# Patient Record
Sex: MALE | Race: WHITE | NOT HISPANIC OR LATINO | Employment: OTHER | URBAN - METROPOLITAN AREA
[De-identification: names, ages, dates, MRNs, and addresses within clinical notes are randomized per-mention and may not be internally consistent; named-entity substitution may affect disease eponyms.]

---

## 2018-04-25 ENCOUNTER — TRANSCRIBE ORDERS (OUTPATIENT)
Dept: ADMINISTRATIVE | Facility: HOSPITAL | Age: 63
End: 2018-04-25

## 2018-04-25 ENCOUNTER — HOSPITAL ENCOUNTER (OUTPATIENT)
Dept: GENERAL RADIOLOGY | Facility: HOSPITAL | Age: 63
Discharge: HOME | End: 2018-04-25
Attending: INTERNAL MEDICINE | Admitting: INTERNAL MEDICINE

## 2018-04-25 DIAGNOSIS — M25.532 LEFT WRIST PAIN: ICD-10-CM

## 2018-04-25 DIAGNOSIS — M25.532 LEFT WRIST PAIN: Primary | ICD-10-CM

## 2018-04-25 PROCEDURE — 73100 X-RAY EXAM OF WRIST: CPT

## 2021-04-06 ENCOUNTER — OFFICE VISIT (OUTPATIENT)
Dept: INTERNAL MEDICINE | Facility: CLINIC | Age: 66
End: 2021-04-06

## 2021-04-06 VITALS
RESPIRATION RATE: 16 BRPM | HEART RATE: 63 BPM | WEIGHT: 229 LBS | TEMPERATURE: 97.7 F | OXYGEN SATURATION: 98 % | SYSTOLIC BLOOD PRESSURE: 138 MMHG | DIASTOLIC BLOOD PRESSURE: 86 MMHG | BODY MASS INDEX: 28.47 KG/M2 | HEIGHT: 75 IN

## 2021-04-06 DIAGNOSIS — N52.9 ERECTILE DYSFUNCTION, UNSPECIFIED ERECTILE DYSFUNCTION TYPE: Primary | ICD-10-CM

## 2021-04-06 DIAGNOSIS — N40.0 BENIGN PROSTATIC HYPERPLASIA WITHOUT LOWER URINARY TRACT SYMPTOMS: ICD-10-CM

## 2021-04-06 PROCEDURE — 99213 OFFICE O/P EST LOW 20 MIN: CPT | Performed by: INTERNAL MEDICINE

## 2021-04-06 RX ORDER — SILDENAFIL 100 MG/1
100 TABLET, FILM COATED ORAL AS NEEDED
Qty: 30 TABLET | Refills: 6 | Status: SHIPPED | OUTPATIENT
Start: 2021-04-06 | End: 2021-12-06 | Stop reason: ALTCHOICE

## 2021-04-06 RX ORDER — SILDENAFIL 100 MG/1
100 TABLET, FILM COATED ORAL
COMMUNITY
End: 2021-04-06 | Stop reason: SDUPTHER

## 2021-10-25 ENCOUNTER — OFFICE VISIT (OUTPATIENT)
Dept: INTERNAL MEDICINE | Facility: CLINIC | Age: 66
End: 2021-10-25

## 2021-10-25 VITALS
TEMPERATURE: 97.3 F | SYSTOLIC BLOOD PRESSURE: 154 MMHG | HEART RATE: 60 BPM | DIASTOLIC BLOOD PRESSURE: 102 MMHG | RESPIRATION RATE: 16 BRPM | WEIGHT: 230 LBS | BODY MASS INDEX: 28.6 KG/M2 | OXYGEN SATURATION: 99 % | HEIGHT: 75 IN

## 2021-10-25 DIAGNOSIS — R03.0 ELEVATED BLOOD-PRESSURE READING WITHOUT DIAGNOSIS OF HYPERTENSION: ICD-10-CM

## 2021-10-25 DIAGNOSIS — Z01.818 PREOPERATIVE CLEARANCE: Primary | ICD-10-CM

## 2021-10-25 PROCEDURE — 99214 OFFICE O/P EST MOD 30 MIN: CPT | Performed by: INTERNAL MEDICINE

## 2021-10-25 RX ORDER — LOSARTAN POTASSIUM 50 MG/1
50 TABLET ORAL DAILY
Qty: 90 TABLET | Refills: 0 | Status: SHIPPED | OUTPATIENT
Start: 2021-10-25 | End: 2021-12-06 | Stop reason: SDUPTHER

## 2021-12-06 ENCOUNTER — OFFICE VISIT (OUTPATIENT)
Dept: INTERNAL MEDICINE | Facility: CLINIC | Age: 66
End: 2021-12-06

## 2021-12-06 VITALS
TEMPERATURE: 97.1 F | OXYGEN SATURATION: 99 % | RESPIRATION RATE: 16 BRPM | SYSTOLIC BLOOD PRESSURE: 152 MMHG | BODY MASS INDEX: 28.85 KG/M2 | WEIGHT: 232 LBS | HEIGHT: 75 IN | HEART RATE: 63 BPM | DIASTOLIC BLOOD PRESSURE: 88 MMHG

## 2021-12-06 DIAGNOSIS — N40.0 BENIGN PROSTATIC HYPERPLASIA WITHOUT LOWER URINARY TRACT SYMPTOMS: ICD-10-CM

## 2021-12-06 DIAGNOSIS — Z00.00 ROUTINE GENERAL MEDICAL EXAMINATION AT A HEALTH CARE FACILITY: Primary | ICD-10-CM

## 2021-12-06 DIAGNOSIS — I10 PRIMARY HYPERTENSION: ICD-10-CM

## 2021-12-06 PROCEDURE — 90732 PPSV23 VACC 2 YRS+ SUBQ/IM: CPT | Performed by: INTERNAL MEDICINE

## 2021-12-06 PROCEDURE — 99397 PER PM REEVAL EST PAT 65+ YR: CPT | Performed by: INTERNAL MEDICINE

## 2021-12-06 PROCEDURE — 90662 IIV NO PRSV INCREASED AG IM: CPT | Performed by: INTERNAL MEDICINE

## 2021-12-06 PROCEDURE — 90472 IMMUNIZATION ADMIN EACH ADD: CPT | Performed by: INTERNAL MEDICINE

## 2021-12-06 PROCEDURE — 90471 IMMUNIZATION ADMIN: CPT | Performed by: INTERNAL MEDICINE

## 2021-12-06 RX ORDER — LOSARTAN POTASSIUM AND HYDROCHLOROTHIAZIDE 12.5; 1 MG/1; MG/1
1 TABLET ORAL DAILY
Qty: 90 TABLET | Refills: 1 | Status: SHIPPED | OUTPATIENT
Start: 2021-12-06 | End: 2022-07-13 | Stop reason: SDUPTHER

## 2021-12-06 RX ORDER — TADALAFIL 20 MG/1
20 TABLET ORAL DAILY PRN
Qty: 30 TABLET | Refills: 1 | Status: SHIPPED | OUTPATIENT
Start: 2021-12-06 | End: 2022-09-09 | Stop reason: ALTCHOICE

## 2022-01-12 ENCOUNTER — OFFICE VISIT (OUTPATIENT)
Dept: INTERNAL MEDICINE | Facility: CLINIC | Age: 67
End: 2022-01-12

## 2022-01-12 VITALS
SYSTOLIC BLOOD PRESSURE: 142 MMHG | HEART RATE: 72 BPM | TEMPERATURE: 97.5 F | DIASTOLIC BLOOD PRESSURE: 84 MMHG | RESPIRATION RATE: 16 BRPM | OXYGEN SATURATION: 93 % | WEIGHT: 230 LBS | BODY MASS INDEX: 28.6 KG/M2 | HEIGHT: 75 IN

## 2022-01-12 DIAGNOSIS — I10 PRIMARY HYPERTENSION: Primary | ICD-10-CM

## 2022-01-12 DIAGNOSIS — N52.9 ERECTILE DYSFUNCTION, UNSPECIFIED ERECTILE DYSFUNCTION TYPE: ICD-10-CM

## 2022-01-12 PROCEDURE — 99213 OFFICE O/P EST LOW 20 MIN: CPT | Performed by: INTERNAL MEDICINE

## 2022-05-16 ENCOUNTER — TELEPHONE (OUTPATIENT)
Dept: INTERNAL MEDICINE | Facility: CLINIC | Age: 67
End: 2022-05-16

## 2022-07-13 RX ORDER — LOSARTAN POTASSIUM AND HYDROCHLOROTHIAZIDE 12.5; 1 MG/1; MG/1
1 TABLET ORAL DAILY
Qty: 90 TABLET | Refills: 1 | Status: SHIPPED | OUTPATIENT
Start: 2022-07-13 | End: 2023-03-21 | Stop reason: SDUPTHER

## 2022-07-25 ENCOUNTER — OFFICE VISIT (OUTPATIENT)
Dept: INTERNAL MEDICINE | Facility: CLINIC | Age: 67
End: 2022-07-25

## 2022-07-25 VITALS
HEART RATE: 75 BPM | WEIGHT: 230 LBS | HEIGHT: 75 IN | BODY MASS INDEX: 28.6 KG/M2 | OXYGEN SATURATION: 96 % | DIASTOLIC BLOOD PRESSURE: 72 MMHG | TEMPERATURE: 96.8 F | RESPIRATION RATE: 16 BRPM | SYSTOLIC BLOOD PRESSURE: 118 MMHG

## 2022-07-25 DIAGNOSIS — I10 PRIMARY HYPERTENSION: Primary | ICD-10-CM

## 2022-07-25 PROCEDURE — 99213 OFFICE O/P EST LOW 20 MIN: CPT | Performed by: INTERNAL MEDICINE

## 2022-09-09 ENCOUNTER — TELEPHONE (OUTPATIENT)
Dept: INTERNAL MEDICINE | Facility: CLINIC | Age: 67
End: 2022-09-09

## 2022-09-09 RX ORDER — SILDENAFIL 100 MG/1
100 TABLET, FILM COATED ORAL DAILY PRN
Qty: 15 TABLET | Refills: 6 | Status: SHIPPED | OUTPATIENT
Start: 2022-09-09 | End: 2023-03-21 | Stop reason: SDUPTHER

## 2023-03-21 ENCOUNTER — OFFICE VISIT (OUTPATIENT)
Dept: INTERNAL MEDICINE | Facility: CLINIC | Age: 68
End: 2023-03-21
Payer: MEDICARE

## 2023-03-21 VITALS
RESPIRATION RATE: 16 BRPM | HEIGHT: 75 IN | WEIGHT: 234 LBS | BODY MASS INDEX: 29.09 KG/M2 | DIASTOLIC BLOOD PRESSURE: 72 MMHG | OXYGEN SATURATION: 97 % | TEMPERATURE: 97.1 F | SYSTOLIC BLOOD PRESSURE: 134 MMHG | HEART RATE: 62 BPM

## 2023-03-21 DIAGNOSIS — Z00.00 ROUTINE GENERAL MEDICAL EXAMINATION AT A HEALTH CARE FACILITY: Primary | ICD-10-CM

## 2023-03-21 DIAGNOSIS — Z12.5 SPECIAL SCREENING FOR MALIGNANT NEOPLASM OF PROSTATE: ICD-10-CM

## 2023-03-21 DIAGNOSIS — I10 PRIMARY HYPERTENSION: ICD-10-CM

## 2023-03-21 PROCEDURE — 1170F FXNL STATUS ASSESSED: CPT | Performed by: INTERNAL MEDICINE

## 2023-03-21 PROCEDURE — G0438 PPPS, INITIAL VISIT: HCPCS | Performed by: INTERNAL MEDICINE

## 2023-03-21 PROCEDURE — 1159F MED LIST DOCD IN RCRD: CPT | Performed by: INTERNAL MEDICINE

## 2023-03-21 PROCEDURE — 1160F RVW MEDS BY RX/DR IN RCRD: CPT | Performed by: INTERNAL MEDICINE

## 2023-03-21 RX ORDER — SILDENAFIL 100 MG/1
100 TABLET, FILM COATED ORAL DAILY PRN
Qty: 15 TABLET | Refills: 6 | Status: SHIPPED | OUTPATIENT
Start: 2023-03-21

## 2023-03-21 RX ORDER — LOSARTAN POTASSIUM AND HYDROCHLOROTHIAZIDE 12.5; 1 MG/1; MG/1
1 TABLET ORAL DAILY
Qty: 90 TABLET | Refills: 1 | Status: SHIPPED | OUTPATIENT
Start: 2023-03-21

## 2023-03-22 LAB
ALBUMIN SERPL-MCNC: 4.5 G/DL (ref 3.5–5.2)
ALBUMIN/GLOB SERPL: 1.7 G/DL
ALP SERPL-CCNC: 80 U/L (ref 39–117)
ALT SERPL-CCNC: 23 U/L (ref 1–41)
APPEARANCE UR: CLEAR
AST SERPL-CCNC: 22 U/L (ref 1–40)
BACTERIA #/AREA URNS HPF: NORMAL /HPF
BASOPHILS # BLD AUTO: 0.05 10*3/MM3 (ref 0–0.2)
BASOPHILS NFR BLD AUTO: 0.9 % (ref 0–1.5)
BILIRUB SERPL-MCNC: 0.8 MG/DL (ref 0–1.2)
BILIRUB UR QL STRIP: NEGATIVE
BUN SERPL-MCNC: 18 MG/DL (ref 8–23)
BUN/CREAT SERPL: 18.2 (ref 7–25)
CALCIUM SERPL-MCNC: 9.7 MG/DL (ref 8.6–10.5)
CASTS URNS MICRO: NORMAL
CHLORIDE SERPL-SCNC: 103 MMOL/L (ref 98–107)
CHOLEST SERPL-MCNC: 154 MG/DL (ref 0–200)
CHOLEST/HDLC SERPL: 3.85 {RATIO}
CO2 SERPL-SCNC: 27.8 MMOL/L (ref 22–29)
COLOR UR: YELLOW
CREAT SERPL-MCNC: 0.99 MG/DL (ref 0.76–1.27)
EGFRCR SERPLBLD CKD-EPI 2021: 84 ML/MIN/1.73
EOSINOPHIL # BLD AUTO: 0.2 10*3/MM3 (ref 0–0.4)
EOSINOPHIL NFR BLD AUTO: 3.6 % (ref 0.3–6.2)
EPI CELLS #/AREA URNS HPF: NORMAL /HPF
ERYTHROCYTE [DISTWIDTH] IN BLOOD BY AUTOMATED COUNT: 13 % (ref 12.3–15.4)
GLOBULIN SER CALC-MCNC: 2.6 GM/DL
GLUCOSE SERPL-MCNC: 96 MG/DL (ref 65–99)
GLUCOSE UR QL STRIP: NEGATIVE
HCT VFR BLD AUTO: 45.3 % (ref 37.5–51)
HCV IGG SERPL QL IA: NON REACTIVE
HDLC SERPL-MCNC: 40 MG/DL (ref 40–60)
HGB BLD-MCNC: 15.4 G/DL (ref 13–17.7)
HGB UR QL STRIP: ABNORMAL
IMM GRANULOCYTES # BLD AUTO: 0.02 10*3/MM3 (ref 0–0.05)
IMM GRANULOCYTES NFR BLD AUTO: 0.4 % (ref 0–0.5)
KETONES UR QL STRIP: NEGATIVE
LDLC SERPL CALC-MCNC: 92 MG/DL (ref 0–100)
LEUKOCYTE ESTERASE UR QL STRIP: NEGATIVE
LYMPHOCYTES # BLD AUTO: 1.76 10*3/MM3 (ref 0.7–3.1)
LYMPHOCYTES NFR BLD AUTO: 32.1 % (ref 19.6–45.3)
MCH RBC QN AUTO: 29.5 PG (ref 26.6–33)
MCHC RBC AUTO-ENTMCNC: 34 G/DL (ref 31.5–35.7)
MCV RBC AUTO: 86.8 FL (ref 79–97)
MONOCYTES # BLD AUTO: 0.51 10*3/MM3 (ref 0.1–0.9)
MONOCYTES NFR BLD AUTO: 9.3 % (ref 5–12)
NEUTROPHILS # BLD AUTO: 2.94 10*3/MM3 (ref 1.7–7)
NEUTROPHILS NFR BLD AUTO: 53.7 % (ref 42.7–76)
NITRITE UR QL STRIP: NEGATIVE
NRBC BLD AUTO-RTO: 0 /100 WBC (ref 0–0.2)
PH UR STRIP: 6.5 [PH] (ref 5–8)
PLATELET # BLD AUTO: 240 10*3/MM3 (ref 140–450)
POTASSIUM SERPL-SCNC: 3.9 MMOL/L (ref 3.5–5.2)
PROT SERPL-MCNC: 7.1 G/DL (ref 6–8.5)
PROT UR QL STRIP: NEGATIVE
PSA SERPL-MCNC: 2.33 NG/ML (ref 0–4)
RBC # BLD AUTO: 5.22 10*6/MM3 (ref 4.14–5.8)
RBC #/AREA URNS HPF: NORMAL /HPF
SODIUM SERPL-SCNC: 138 MMOL/L (ref 136–145)
SP GR UR STRIP: 1.02 (ref 1–1.03)
TRIGL SERPL-MCNC: 121 MG/DL (ref 0–150)
TSH SERPL DL<=0.005 MIU/L-ACNC: 1.7 UIU/ML (ref 0.27–4.2)
UROBILINOGEN UR STRIP-MCNC: ABNORMAL MG/DL
VLDLC SERPL CALC-MCNC: 22 MG/DL (ref 5–40)
WBC # BLD AUTO: 5.48 10*3/MM3 (ref 3.4–10.8)
WBC #/AREA URNS HPF: NORMAL /HPF

## 2023-03-29 LAB
Lab: NORMAL
TESTOST SERPL-MCNC: 443 NG/DL (ref 264–916)
WRITTEN AUTHORIZATION: NORMAL

## 2023-08-31 ENCOUNTER — TELEPHONE (OUTPATIENT)
Dept: INTERNAL MEDICINE | Facility: CLINIC | Age: 68
End: 2023-08-31
Payer: MEDICARE

## 2023-09-01 ENCOUNTER — OFFICE VISIT (OUTPATIENT)
Dept: INTERNAL MEDICINE | Facility: CLINIC | Age: 68
End: 2023-09-01
Payer: MEDICARE

## 2023-09-01 VITALS
OXYGEN SATURATION: 98 % | WEIGHT: 231.4 LBS | DIASTOLIC BLOOD PRESSURE: 82 MMHG | TEMPERATURE: 97.1 F | RESPIRATION RATE: 16 BRPM | HEART RATE: 74 BPM | HEIGHT: 75 IN | BODY MASS INDEX: 28.77 KG/M2 | SYSTOLIC BLOOD PRESSURE: 132 MMHG

## 2023-09-01 DIAGNOSIS — Z01.818 PRE-OPERATIVE CLEARANCE: Primary | ICD-10-CM

## 2023-09-01 PROCEDURE — 99213 OFFICE O/P EST LOW 20 MIN: CPT | Performed by: INTERNAL MEDICINE

## 2023-09-01 RX ORDER — POLYETHYLENE GLYCOL 3350, SODIUM SULFATE, SODIUM CHLORIDE, POTASSIUM CHLORIDE, ASCORBIC ACID, SODIUM ASCORBATE 140-9-5.2G
3 KIT ORAL
COMMUNITY
Start: 2023-05-31

## 2023-10-17 ENCOUNTER — TELEPHONE (OUTPATIENT)
Dept: INTERNAL MEDICINE | Facility: CLINIC | Age: 68
End: 2023-10-17
Payer: MEDICARE

## 2023-10-24 ENCOUNTER — OFFICE VISIT (OUTPATIENT)
Dept: INTERNAL MEDICINE | Facility: CLINIC | Age: 68
End: 2023-10-24
Payer: MEDICARE

## 2023-10-24 VITALS
BODY MASS INDEX: 29.59 KG/M2 | HEART RATE: 74 BPM | OXYGEN SATURATION: 97 % | TEMPERATURE: 96.9 F | WEIGHT: 238 LBS | RESPIRATION RATE: 16 BRPM | HEIGHT: 75 IN | SYSTOLIC BLOOD PRESSURE: 132 MMHG | DIASTOLIC BLOOD PRESSURE: 80 MMHG

## 2023-10-24 DIAGNOSIS — N40.0 BENIGN PROSTATIC HYPERPLASIA WITHOUT LOWER URINARY TRACT SYMPTOMS: Primary | ICD-10-CM

## 2023-10-24 DIAGNOSIS — I10 PRIMARY HYPERTENSION: ICD-10-CM

## 2023-10-24 PROCEDURE — 99214 OFFICE O/P EST MOD 30 MIN: CPT | Performed by: INTERNAL MEDICINE

## 2023-10-24 RX ORDER — LOSARTAN POTASSIUM 100 MG/1
100 TABLET ORAL DAILY
Qty: 90 TABLET | Refills: 2 | Status: SHIPPED | OUTPATIENT
Start: 2023-10-24

## 2023-10-24 RX ORDER — TAMSULOSIN HYDROCHLORIDE 0.4 MG/1
1 CAPSULE ORAL DAILY
Qty: 90 CAPSULE | Refills: 1 | Status: SHIPPED | OUTPATIENT
Start: 2023-10-24

## 2024-03-25 ENCOUNTER — OFFICE VISIT (OUTPATIENT)
Dept: INTERNAL MEDICINE | Facility: CLINIC | Age: 69
End: 2024-03-25
Payer: MEDICARE

## 2024-03-25 VITALS
HEART RATE: 77 BPM | SYSTOLIC BLOOD PRESSURE: 118 MMHG | OXYGEN SATURATION: 98 % | HEIGHT: 75 IN | WEIGHT: 237 LBS | RESPIRATION RATE: 16 BRPM | BODY MASS INDEX: 29.47 KG/M2 | DIASTOLIC BLOOD PRESSURE: 80 MMHG | TEMPERATURE: 97.5 F

## 2024-03-25 DIAGNOSIS — I10 PRIMARY HYPERTENSION: Primary | ICD-10-CM

## 2024-03-25 DIAGNOSIS — N40.0 BENIGN PROSTATIC HYPERPLASIA WITHOUT LOWER URINARY TRACT SYMPTOMS: ICD-10-CM

## 2024-03-25 PROCEDURE — 99214 OFFICE O/P EST MOD 30 MIN: CPT | Performed by: INTERNAL MEDICINE

## 2024-03-25 RX ORDER — TAMSULOSIN HYDROCHLORIDE 0.4 MG/1
1 CAPSULE ORAL DAILY
Qty: 90 CAPSULE | Refills: 1 | Status: SHIPPED | OUTPATIENT
Start: 2024-03-25

## 2024-04-10 RX ORDER — SILDENAFIL 100 MG/1
100 TABLET, FILM COATED ORAL DAILY PRN
Qty: 75 TABLET | Refills: 4 | Status: SHIPPED | OUTPATIENT
Start: 2024-04-10

## 2024-06-12 RX ORDER — TAMSULOSIN HYDROCHLORIDE 0.4 MG/1
1 CAPSULE ORAL DAILY
Qty: 90 CAPSULE | Refills: 1 | Status: SHIPPED | OUTPATIENT
Start: 2024-06-12

## 2024-07-26 RX ORDER — LOSARTAN POTASSIUM 100 MG/1
100 TABLET ORAL DAILY
Qty: 90 TABLET | Refills: 2 | Status: SHIPPED | OUTPATIENT
Start: 2024-07-26

## 2024-09-11 RX ORDER — LOSARTAN POTASSIUM 100 MG/1
100 TABLET ORAL DAILY
Qty: 90 TABLET | Refills: 2 | Status: SHIPPED | OUTPATIENT
Start: 2024-09-11

## 2024-09-11 RX ORDER — TAMSULOSIN HYDROCHLORIDE 0.4 MG/1
1 CAPSULE ORAL DAILY
Qty: 90 CAPSULE | Refills: 1 | Status: SHIPPED | OUTPATIENT
Start: 2024-09-11

## 2024-09-30 ENCOUNTER — OFFICE VISIT (OUTPATIENT)
Dept: INTERNAL MEDICINE | Facility: CLINIC | Age: 69
End: 2024-09-30
Payer: MEDICARE

## 2024-09-30 VITALS
DIASTOLIC BLOOD PRESSURE: 80 MMHG | RESPIRATION RATE: 18 BRPM | HEIGHT: 75 IN | WEIGHT: 237.2 LBS | SYSTOLIC BLOOD PRESSURE: 126 MMHG | HEART RATE: 94 BPM | OXYGEN SATURATION: 98 % | BODY MASS INDEX: 29.49 KG/M2

## 2024-09-30 DIAGNOSIS — E74.819 DISORDERS OF GLUCOSE TRANSPORT, UNSPECIFIED: ICD-10-CM

## 2024-09-30 DIAGNOSIS — I10 PRIMARY HYPERTENSION: ICD-10-CM

## 2024-09-30 DIAGNOSIS — Z12.5 SPECIAL SCREENING FOR MALIGNANT NEOPLASM OF PROSTATE: ICD-10-CM

## 2024-09-30 DIAGNOSIS — N40.0 BENIGN PROSTATIC HYPERPLASIA WITHOUT LOWER URINARY TRACT SYMPTOMS: Primary | ICD-10-CM

## 2024-09-30 DIAGNOSIS — Z00.00 ROUTINE GENERAL MEDICAL EXAMINATION AT A HEALTH CARE FACILITY: ICD-10-CM

## 2024-09-30 PROCEDURE — 1170F FXNL STATUS ASSESSED: CPT | Performed by: INTERNAL MEDICINE

## 2024-09-30 PROCEDURE — G0439 PPPS, SUBSEQ VISIT: HCPCS | Performed by: INTERNAL MEDICINE

## 2024-10-01 LAB
ALBUMIN SERPL-MCNC: 4.1 G/DL (ref 3.5–5.2)
ALBUMIN/GLOB SERPL: 1.8 G/DL
ALP SERPL-CCNC: 89 U/L (ref 39–117)
ALT SERPL-CCNC: 18 U/L (ref 1–41)
APPEARANCE UR: CLEAR
AST SERPL-CCNC: 16 U/L (ref 1–40)
BACTERIA #/AREA URNS HPF: NORMAL /HPF
BASOPHILS # BLD AUTO: 0.05 10*3/MM3 (ref 0–0.2)
BASOPHILS NFR BLD AUTO: 1.1 % (ref 0–1.5)
BILIRUB SERPL-MCNC: 0.6 MG/DL (ref 0–1.2)
BILIRUB UR QL STRIP: NEGATIVE
BUN SERPL-MCNC: 11 MG/DL (ref 8–23)
BUN/CREAT SERPL: 11.3 (ref 7–25)
CALCIUM SERPL-MCNC: 9.1 MG/DL (ref 8.6–10.5)
CASTS URNS MICRO: NORMAL
CHLORIDE SERPL-SCNC: 105 MMOL/L (ref 98–107)
CHOLEST SERPL-MCNC: 145 MG/DL (ref 0–200)
CHOLEST/HDLC SERPL: 3.92 {RATIO}
CO2 SERPL-SCNC: 25.9 MMOL/L (ref 22–29)
COLOR UR: YELLOW
CREAT SERPL-MCNC: 0.97 MG/DL (ref 0.76–1.27)
EGFRCR SERPLBLD CKD-EPI 2021: 85 ML/MIN/1.73
EOSINOPHIL # BLD AUTO: 0.2 10*3/MM3 (ref 0–0.4)
EOSINOPHIL NFR BLD AUTO: 4.3 % (ref 0.3–6.2)
EPI CELLS #/AREA URNS HPF: NORMAL /HPF
ERYTHROCYTE [DISTWIDTH] IN BLOOD BY AUTOMATED COUNT: 13.4 % (ref 12.3–15.4)
GLOBULIN SER CALC-MCNC: 2.3 GM/DL
GLUCOSE SERPL-MCNC: 88 MG/DL (ref 65–99)
GLUCOSE UR QL STRIP: NEGATIVE
HBA1C MFR BLD: 5.5 % (ref 4.8–5.6)
HCT VFR BLD AUTO: 41.1 % (ref 37.5–51)
HDLC SERPL-MCNC: 37 MG/DL (ref 40–60)
HGB BLD-MCNC: 13.5 G/DL (ref 13–17.7)
HGB UR QL STRIP: NEGATIVE
IMM GRANULOCYTES # BLD AUTO: 0.01 10*3/MM3 (ref 0–0.05)
IMM GRANULOCYTES NFR BLD AUTO: 0.2 % (ref 0–0.5)
KETONES UR QL STRIP: NEGATIVE
LDLC SERPL CALC-MCNC: 84 MG/DL (ref 0–100)
LEUKOCYTE ESTERASE UR QL STRIP: NEGATIVE
LYMPHOCYTES # BLD AUTO: 1.47 10*3/MM3 (ref 0.7–3.1)
LYMPHOCYTES NFR BLD AUTO: 31.3 % (ref 19.6–45.3)
MCH RBC QN AUTO: 29.2 PG (ref 26.6–33)
MCHC RBC AUTO-ENTMCNC: 32.8 G/DL (ref 31.5–35.7)
MCV RBC AUTO: 88.8 FL (ref 79–97)
MONOCYTES # BLD AUTO: 0.45 10*3/MM3 (ref 0.1–0.9)
MONOCYTES NFR BLD AUTO: 9.6 % (ref 5–12)
NEUTROPHILS # BLD AUTO: 2.52 10*3/MM3 (ref 1.7–7)
NEUTROPHILS NFR BLD AUTO: 53.5 % (ref 42.7–76)
NITRITE UR QL STRIP: NEGATIVE
NRBC BLD AUTO-RTO: 0 /100 WBC (ref 0–0.2)
PH UR STRIP: 5.5 [PH] (ref 5–8)
PLATELET # BLD AUTO: 276 10*3/MM3 (ref 140–450)
POTASSIUM SERPL-SCNC: 4.1 MMOL/L (ref 3.5–5.2)
PROT SERPL-MCNC: 6.4 G/DL (ref 6–8.5)
PROT UR QL STRIP: NEGATIVE
PSA SERPL-MCNC: 2.59 NG/ML (ref 0–4)
RBC # BLD AUTO: 4.63 10*6/MM3 (ref 4.14–5.8)
RBC #/AREA URNS HPF: NORMAL /HPF
SODIUM SERPL-SCNC: 140 MMOL/L (ref 136–145)
SP GR UR STRIP: 1.02 (ref 1–1.03)
TRIGL SERPL-MCNC: 134 MG/DL (ref 0–150)
TSH SERPL DL<=0.005 MIU/L-ACNC: 1.34 UIU/ML (ref 0.27–4.2)
UROBILINOGEN UR STRIP-MCNC: NORMAL MG/DL
VLDLC SERPL CALC-MCNC: 24 MG/DL (ref 5–40)
WBC # BLD AUTO: 4.7 10*3/MM3 (ref 3.4–10.8)
WBC #/AREA URNS HPF: NORMAL /HPF

## 2024-12-03 ENCOUNTER — OFFICE VISIT (OUTPATIENT)
Dept: INTERNAL MEDICINE | Facility: CLINIC | Age: 69
End: 2024-12-03
Payer: MEDICARE

## 2024-12-03 VITALS
DIASTOLIC BLOOD PRESSURE: 86 MMHG | HEIGHT: 75 IN | BODY MASS INDEX: 28.85 KG/M2 | HEART RATE: 78 BPM | WEIGHT: 232 LBS | OXYGEN SATURATION: 100 % | SYSTOLIC BLOOD PRESSURE: 132 MMHG

## 2024-12-03 DIAGNOSIS — N52.9 ERECTILE DYSFUNCTION, UNSPECIFIED ERECTILE DYSFUNCTION TYPE: ICD-10-CM

## 2024-12-03 DIAGNOSIS — R53.83 OTHER FATIGUE: Primary | ICD-10-CM

## 2024-12-03 DIAGNOSIS — N40.0 BENIGN PROSTATIC HYPERPLASIA WITHOUT LOWER URINARY TRACT SYMPTOMS: ICD-10-CM

## 2024-12-03 DIAGNOSIS — I10 PRIMARY HYPERTENSION: ICD-10-CM

## 2024-12-03 DIAGNOSIS — G47.33 OSA (OBSTRUCTIVE SLEEP APNEA): ICD-10-CM

## 2024-12-03 PROCEDURE — 99214 OFFICE O/P EST MOD 30 MIN: CPT | Performed by: INTERNAL MEDICINE

## 2024-12-03 PROCEDURE — G2211 COMPLEX E/M VISIT ADD ON: HCPCS | Performed by: INTERNAL MEDICINE

## 2024-12-03 RX ORDER — AMLODIPINE BESYLATE 2.5 MG/1
2.5 TABLET ORAL DAILY
Qty: 90 TABLET | Refills: 1 | Status: SHIPPED | OUTPATIENT
Start: 2024-12-03

## 2024-12-03 NOTE — PROGRESS NOTES
"Chief Complaint  Follow-up (Nose bleed for almost  5 hours, High BP, Urination had to stop same time had nose Bleed)    Subjective        Valerio Krishnan presents to [facility]  History of Present Illness  Was seen in the emergency department for epistaxis.  Eventually it stopped with no other particular intervention.  He is trying to irrigate the nose.  They recently got a whole house humidifier.  He does have some dryness in the mouth when he sleeps but thinks he has sleep apnea or at least his wife thinks he has sleep apnea.  Also complains of a lot of fatigue and decreased libido and erectile dysfunction.  Had a testosterone level checked last year which was normal.  Some elevated blood pressures at home.  Repeat blood pressure today was good.  Systolic blood pressures in the 140s and 150s at home  Objective   Vital Signs:  /86   Pulse 78   Ht 190.5 cm (75\")   Wt 105 kg (232 lb)   SpO2 100%   BMI 29.00 kg/m²   Estimated body mass index is 29 kg/m² as calculated from the following:    Height as of this encounter: 190.5 cm (75\").    Weight as of this encounter: 105 kg (232 lb).            Physical Exam  Vitals and nursing note reviewed.   Constitutional:       Appearance: Normal appearance.   HENT:      Head: Normocephalic and atraumatic.      Right Ear: Tympanic membrane normal.      Left Ear: Tympanic membrane normal.      Nose:      Comments: No obvious bleeding vessels in the nares     Mouth/Throat:      Pharynx: Oropharynx is clear.   Cardiovascular:      Rate and Rhythm: Normal rate and regular rhythm.   Pulmonary:      Effort: Pulmonary effort is normal.      Breath sounds: Normal breath sounds.   Abdominal:      General: Abdomen is flat.      Palpations: Abdomen is soft.   Musculoskeletal:         General: No swelling or deformity.      Cervical back: Neck supple.      Right lower leg: No edema.      Left lower leg: No edema.   Skin:     General: " Skin is warm.      Capillary Refill: Capillary refill takes less than 2 seconds.      Findings: No rash.   Neurological:      General: No focal deficit present.      Mental Status: He is alert and oriented to person, place, and time.        Result Review :      Data reviewed : Recent hospitalization notes ER records reviewed           Assessment and Plan   Diagnoses and all orders for this visit:    1. Other fatigue (Primary)  -     Testosterone    2. Erectile dysfunction, unspecified erectile dysfunction type    3. Benign prostatic hyperplasia without lower urinary tract symptoms    4. Primary hypertension  -     Home Sleep Study; Future    5. MOODY (obstructive sleep apnea)  -     Home Sleep Study; Future    Other orders  -     amLODIPine (NORVASC) 2.5 MG tablet; Take 1 tablet by mouth Daily.  Dispense: 90 tablet; Refill: 1    Epistaxis and hypertension in the emergency department.  He was given clonidine in the hospital apparently.  Blood pressure looks much better today.  He is getting systolics in the 140s and 150s at home and he takes the losartan for the most part every day.  He did not tolerate the hydrochlorothiazide previously so we will start with a low-dose addition of amlodipine 2.5 mg daily and recheck in 1 month.  Fatigue, ED and decreased libido.  Check testosterone level last year and was normal.  Repeated this year.  Probably multifactorial and typical for the age.  If the testosterone level is low and he is interested in TRT we can address that  If the nose continues to bleed we can have see ENT although I do not think he needs to do that yet.  Possible MOODY with snoring and witnessed apnea per his wife.  Will schedule home sleep study         Follow Up   Return in about 4 weeks (around 12/31/2024).  Patient was given instructions and counseling regarding his condition or for health maintenance advice. Please see specific information pulled into the AVS if appropriate.

## 2024-12-04 LAB — TESTOST SERPL-MCNC: 455 NG/DL (ref 264–916)

## 2024-12-31 ENCOUNTER — OFFICE VISIT (OUTPATIENT)
Dept: INTERNAL MEDICINE | Facility: CLINIC | Age: 69
End: 2024-12-31
Payer: MEDICARE

## 2024-12-31 VITALS
WEIGHT: 232 LBS | HEIGHT: 75 IN | RESPIRATION RATE: 16 BRPM | TEMPERATURE: 97.9 F | DIASTOLIC BLOOD PRESSURE: 88 MMHG | BODY MASS INDEX: 28.85 KG/M2 | HEART RATE: 74 BPM | OXYGEN SATURATION: 98 % | SYSTOLIC BLOOD PRESSURE: 134 MMHG

## 2024-12-31 DIAGNOSIS — I10 PRIMARY HYPERTENSION: Primary | ICD-10-CM

## 2024-12-31 PROCEDURE — 99213 OFFICE O/P EST LOW 20 MIN: CPT | Performed by: INTERNAL MEDICINE

## 2024-12-31 RX ORDER — AMLODIPINE BESYLATE 5 MG/1
5 TABLET ORAL DAILY
Qty: 90 TABLET | Refills: 1 | Status: SHIPPED | OUTPATIENT
Start: 2024-12-31

## 2024-12-31 RX ORDER — TAMSULOSIN HYDROCHLORIDE 0.4 MG/1
1 CAPSULE ORAL DAILY
COMMUNITY
Start: 2024-12-09

## 2024-12-31 NOTE — PROGRESS NOTES
"Chief Complaint  Hypertension    Subjective        Valerio Krishnan presents to [facility]  Hypertension      Denies any significant headache, shortness of breath or chest pain.  No visual changes.  Taking medication without complication.   Objective   Vital Signs:  /88 (BP Location: Left arm, Patient Position: Sitting, Cuff Size: Large Adult)   Pulse 74   Temp 97.9 °F (36.6 °C) (Temporal)   Resp 16   Ht 190.5 cm (75\")   Wt 105 kg (232 lb)   SpO2 98%   BMI 29.00 kg/m²   Estimated body mass index is 29 kg/m² as calculated from the following:    Height as of this encounter: 190.5 cm (75\").    Weight as of this encounter: 105 kg (232 lb).            Physical Exam  Vitals and nursing note reviewed.   Constitutional:       Appearance: Normal appearance.   HENT:      Head: Normocephalic and atraumatic.   Cardiovascular:      Rate and Rhythm: Normal rate and regular rhythm.   Pulmonary:      Effort: Pulmonary effort is normal.      Breath sounds: Normal breath sounds.   Abdominal:      General: Abdomen is flat.      Palpations: Abdomen is soft.   Musculoskeletal:         General: No swelling or deformity.      Cervical back: Neck supple.      Right lower leg: No edema.      Left lower leg: No edema.   Skin:     General: Skin is warm.      Capillary Refill: Capillary refill takes less than 2 seconds.      Findings: No rash.   Neurological:      General: No focal deficit present.      Mental Status: He is alert and oriented to person, place, and time.        Result Review :                Assessment and Plan   Diagnoses and all orders for this visit:    1. Primary hypertension (Primary)    Other orders  -     amLODIPine (NORVASC) 5 MG tablet; Take 1 tablet by mouth Daily.  Dispense: 90 tablet; Refill: 1    Hypertension better controlled.  Still mild diastolic elevation.  He is monitoring closely at home.  Increase amlodipine to 5 mg and recheck in about 3 months " or sooner if any problems.         Follow Up   Return in about 3 months (around 3/31/2025) for Recheck.  Patient was given instructions and counseling regarding his condition or for health maintenance advice. Please see specific information pulled into the AVS if appropriate.

## 2025-03-05 RX ORDER — TAMSULOSIN HYDROCHLORIDE 0.4 MG/1
1 CAPSULE ORAL DAILY
Qty: 90 CAPSULE | Refills: 4 | Status: SHIPPED | OUTPATIENT
Start: 2025-03-05

## 2025-03-10 ENCOUNTER — OFFICE VISIT (OUTPATIENT)
Dept: SLEEP MEDICINE | Facility: HOSPITAL | Age: 70
End: 2025-03-10
Payer: MEDICARE

## 2025-03-10 VITALS
HEART RATE: 71 BPM | DIASTOLIC BLOOD PRESSURE: 68 MMHG | WEIGHT: 233 LBS | BODY MASS INDEX: 28.97 KG/M2 | OXYGEN SATURATION: 98 % | SYSTOLIC BLOOD PRESSURE: 115 MMHG | HEIGHT: 75 IN

## 2025-03-10 DIAGNOSIS — E66.3 OVERWEIGHT (BMI 25.0-29.9): ICD-10-CM

## 2025-03-10 DIAGNOSIS — G47.33 OBSTRUCTIVE SLEEP APNEA, ADULT: Primary | ICD-10-CM

## 2025-03-10 PROCEDURE — G0463 HOSPITAL OUTPT CLINIC VISIT: HCPCS

## 2025-03-12 ENCOUNTER — DOCUMENTATION (OUTPATIENT)
Dept: SLEEP MEDICINE | Facility: HOSPITAL | Age: 70
End: 2025-03-12
Payer: MEDICARE

## 2025-03-12 NOTE — PROGRESS NOTES
Pt was seen in sleep center by Dr. Wolfe.  New set up for therapy device and supply order has been sent over to [facility].  Compliance scheduled for 5/2025

## 2025-05-19 ENCOUNTER — OFFICE VISIT (OUTPATIENT)
Dept: SLEEP MEDICINE | Facility: HOSPITAL | Age: 70
End: 2025-05-19
Payer: MEDICARE

## 2025-05-19 ENCOUNTER — DOCUMENTATION (OUTPATIENT)
Dept: SLEEP MEDICINE | Facility: HOSPITAL | Age: 70
End: 2025-05-19
Payer: MEDICARE

## 2025-05-19 VITALS
WEIGHT: 230 LBS | SYSTOLIC BLOOD PRESSURE: 106 MMHG | OXYGEN SATURATION: 98 % | DIASTOLIC BLOOD PRESSURE: 62 MMHG | HEART RATE: 78 BPM | BODY MASS INDEX: 28.6 KG/M2 | HEIGHT: 75 IN

## 2025-05-19 DIAGNOSIS — G47.10 PERSISTENT HYPERSOMNOLENCE DISORDER: ICD-10-CM

## 2025-05-19 DIAGNOSIS — Z78.9 DIFFICULTY USING CONTINUOUS POSITIVE AIRWAY PRESSURE (CPAP) FULL FACE MASK: ICD-10-CM

## 2025-05-19 DIAGNOSIS — G47.33 OBSTRUCTIVE SLEEP APNEA, ADULT: Primary | ICD-10-CM

## 2025-05-19 DIAGNOSIS — E66.3 OVERWEIGHT (BMI 25.0-29.9): ICD-10-CM

## 2025-05-19 PROCEDURE — G0463 HOSPITAL OUTPT CLINIC VISIT: HCPCS

## 2025-05-19 NOTE — PROGRESS NOTES
"[facility] [place] [facility]  [address]  [place] [address]  [phone]    PCP: Connor Leslie MD (Tony)    Reason for visit:  Sleep disorders: MOODY    Valerio is a 69 y.o.male who was seen in the Sleep Disorders Center today. He was setup with CPAP and here to review. He finds it beneficial and sleeps better but still with elevated AHI. He sleeps from 10pm to 5am. He uses ffm, fits well but wants to try nasal interface. He is somewhat rested when he awakens but gets sleepy by the evening.  Whiting Sleepiness Scale is 7. Caffeine 1.5 per day. Alcohol 0 per week.    Valerio  reports that he has never smoked. He has never been exposed to tobacco smoke. He has never used smokeless tobacco.    Pertinent Positive Review of Systems of denies  Rest of Review of Systems was negative as recorded in Sleep Questionnaire.    Patient  has a past medical history of Benign prostatic hyperplasia without lower urinary tract symptoms, ED (erectile dysfunction), Elevated blood pressure reading without diagnosis of hypertension, Other chest pain, and Pain, wrist.     Current Medications:    Current Outpatient Medications:     amLODIPine (NORVASC) 5 MG tablet, Take 1 tablet by mouth Daily., Disp: 90 tablet, Rfl: 1    losartan (Cozaar) 100 MG tablet, Take 1 tablet by mouth Daily., Disp: 90 tablet, Rfl: 2    PEG-KCl-NaCl-NaSulf-Na Asc-C (Plenvu) 140 g reconstituted solution solution, Take 420 g by mouth. (Patient not taking: Reported on 10/24/2023), Disp: , Rfl:     sildenafil (VIAGRA) 100 MG tablet, TAKE 1 TABLET BY MOUTH EVERY DAY AS NEEDED FOR ERECTILE DYSFUNCTION, Disp: 75 tablet, Rfl: 4    tamsulosin (FLOMAX) 0.4 MG capsule 24 hr capsule, Take 1 capsule by mouth once daily, Disp: 90 capsule, Rfl: 4   also entered in Sleep Questionnaire         Vital Signs: /62   Pulse 78   Ht 190.5 cm (75\")   Wt 104 kg (230 lb)   SpO2 98%   BMI 28.75 kg/m²     Body mass index is 28.75 kg/m².       " "Tongue: Large       Dentition: good       Pharynx: Posterior pharyngeal pillars are unable   Mallampatti: IV (only hard palate visible)        General: Alert. Cooperative. Well developed. No acute distress.             Nose: No septal deviation. No drainage.          Throat: No oral lesions. No thrush. Moist mucous membranes.           Lungs:  Clear to auscultation bilaterally.            Heart:  Regular rhythm and normal rate. No murmur.     Diagnostic data available to date is as below and was reviewed on current visit:  12/27/2024: AHI 53.5 on snap diagnostics test done by PCP. O2 saturation below 89% for 75 minutes or 22% of sleep time. Positional data is unavailable.     No results found for: \"IRON\", \"TIBC\", \"FERRITIN\"    Most current available usage data reviewed on 05/19/2025:  No scans are attached to the encounter or orders placed in the encounter.  98% compliance average 6-1/2 hours AHI 15.9 with average pressure 12 cm.  No significant air leaks are seen.    DME Company: [facility]    [place] [facility]:    full face mask    Replace head-gear every 3 months.  Replace cushions every 2-4 weeks.     A 4604 Heated Tubing  every 3 mth    A 7037 Standard Tubing  every 3 mth   x A 7035 Headgear  every 3 mth   x A 7046 Repl Humidifier Chamber  every 6 yrs   x A 7038 Disposable Filters  2 per mth   x A 7039 Non-disposable Filter  every 6 mth   x A 7036 Chin Strap  every 6 mth     No orders of the defined types were placed in this encounter.         Impression:  1. Obstructive sleep apnea, adult    2. Overweight (BMI 25.0-29.9)    3. Persistent hypersomnolence disorder    4. Difficulty using continuous positive airway pressure (CPAP) full face mask        Plan:  Valerio is compliant. Change to auto 10-20. If AHI still elevated then do titration.  Patient continues to get sleepy later in the day.  He will speak to DME about switching his mask style.    Effective treatment of " sleep apnea reduces the associated cardiovascular and cerebrovascular risks associated. In patients with elevated BMI, weight loss can be additionally beneficial. With use of positive pressure device; change of supplies per the permitted insurance schedule is necessary.    This patient is compliant with PAP machine and benefits from its use.  Apnea hypopneas index is corrected/improved.  Daytime hypersomnolence has resolved.     Patient will follow up in this clinic in 2 months APRN    Thank you for allowing me to participate in your patient's care.    Electronically signed by Ruben Wolfe MD, 05/19/25, 4:08 PM EDT.    Part of this note may be an electronic transcription/translation of spoken language to printed text using the Dragon Dictation System.

## 2025-05-19 NOTE — PROGRESS NOTES
Patient was seen in sleep center office today.  Per Dr. Wolfe :  Pressure changes were made in [facility]     Device   [id number] - AirSense 11 AutoSet   View  05/19/2025, 12:54 PM    by Anuj Lilly    Settings sent to device    Request [id number]    Set Mode to AutoSet  Set Min Pressure to 5 cmH2O  Set Max Pressure to 10 cmH2O  Set Response to Standard  Set EPR to Fulltime  Set EPR level to 2  Set Ramp enable to On  Set Ramp time to 5 min  Set Start pressure to 4.0 cmH2O  Set Humidifier level to 6  05/02/2024, 06:34 AM    Settings confirmed on device    Set Mode to AutoSet  Set Min Pressure to 5 cmH2O  Set Max Pressure to 20 cmH2O  Set Response to Standard  Set EPR to Fulltime  Set EPR level to 2  Set Ramp enable to On  Set Ramp time to 5 min  Set Start pressure to 4.0 cmH2O  Set Humidifier level to 6  02/27/2024, 08:09 PM    Settings confirmed on device    Set Mode to AutoSet  Set Min Pressure to 5 cmH2O  Set Max Pressure to 20 cmH2O  Set Response to Standard  Set EPR to Fulltime  Set EPR level to 2  Set Ramp enable to On  Set Ramp time to 5 min  Set Start pressure to 4.0 cmH2O  Set Humidifier level to 6  02/24/2024, 08:01 PM    Settings confirmed on device    Set Mode to AutoSet  Set Min Pressure to 5 cmH2O  Set Max Pressure to 20 cmH2O  Set Response to Standard  Set EPR to Fulltime  Set EPR level to 2  Set Ramp enable to On  Set Ramp time to 5 min  Set Start pressure to 4.0 cmH2O  Set Humidifier level to 8  09/16/2023, 09:47 PM    Settings confirmed on device    Set Mode to AutoSet  Set Min Pressure to 5 cmH2O  Set Max Pressure to 20 cmH2O  Set Response to Standard  Set EPR to Fulltime  Set EPR level to 2  Set Ramp enable to On  Set Ramp time to 5 min  Set Start pressure to 4.0 cmH2O  Set Humidifier level to 5  08/18/2023, 06:21 PM    Settings confirmed on device    Set Mode to AutoSet  Set Min Pressure to 5 cmH2O  Set Max Pressure to 20 cmH2O  Set Response to Standard  Set EPR to  Fulltime  Set EPR level to 2  Set Ramp enable to Auto  Set Start pressure to 4.0 cmH2O  Set Humidifier level to 5  08/18/2023, 06:21 PM    Settings confirmed on device    Set Mode to AutoSet  Set Min Pressure to 5 cmH2O  Set Max Pressure to 20 cmH2O  Set Response to Standard  Set EPR to Off  Set Ramp enable to Auto  Set Start pressure to 4.0 cmH2O  Set Humidifier level to 5  08/18/2023, 06:21 PM    Settings confirmed on device    Set Mode to AutoSet  Set Min Pressure to 5 cmH2O  Set Max Pressure to 20 cmH2O  Set Response to Standard  Set EPR to Fulltime  Set EPR level to 2  Set Ramp enable to Auto  Set Start pressure to 4.0 cmH2O  Set Humidifier level to 5  07/28/2023, 04:09 AM    Settings confirmed on device    Set Mode to AutoSet  Set Min Pressure to 5 cmH2O  Set Max Pressure to 20 cmH2O  Set Response to Standard  Set EPR to Fulltime  Set EPR level to 2  Set Ramp enable to Auto  Set Start pressure to 4.0 cmH2O  Set Humidifier level to 4  06/08/2023, 04:48 PM    Settings confirmed on device    Set Mode to AutoSet  Set Min Pressure to 5 cmH2O  Set Max Pressure to 20 cmH2O  Set Response to Standard  Set EPR to Fulltime  Set EPR level to 2  Set Ramp enable to Auto  Set Start pressure to 4.0 cmH2O  Set Humidifier level to 4  05/28/2023, 07:46 PM    Settings confirmed on device    Set Mode to AutoSet  Set Min Pressure to 5 cmH2O  Set Max Pressure to 20 cmH2O  Set Response to Standard  Set EPR to Fulltime  Set EPR level to 2  Set Ramp enable to Auto  Set Start pressure to 4.0 cmH2O  Set Humidifier level to 3  04/20/2023, 02:12 PM    Settings confirmed on device    Set Mode to AutoSet  Set Min Pressure to 5 cmH2O  Set Max Pressure to 20 cmH2O  Set Response to Standard  Set EPR to Fulltime  Set EPR level to 2  Set Ramp enable to Auto  Set Start pressure to 4.0 cmH2O  Set Humidifier level to 7  03/24/2022, 12:51 PM    Settings confirmed on device    Set Mode to AutoSet  Set Min Pressure to 5 cmH2O  Set Max Pressure to 20  cmH2O  Set Response to Standard  Set EPR to Fulltime  Set EPR level to 2  Set Ramp enable to Auto  Set Start pressure to 4.0 cmH2O  Set Humidifier level to 5  03/23/2022, 07:36 PM    Settings confirmed on device    Set Mode to AutoSet  Set Min Pressure to 5 cmH2O  Set Max Pressure to 20 cmH2O  Set Response to Standard  Set EPR to Fulltime  Set EPR level to 2  Set Ramp enable to Auto  Set Start pressure to 4.0 cmH2O  Set Humidifier level to 4  03/23/2022, 10:52 AM    Settings confirmed on device    Set Mode to AutoSet  Set Min Pressure to 5 cmH2O  Set Max Pressure to 20 cmH2O  Set Response to Standard  Set EPR to Fulltime  Set EPR level to 2  Set Ramp enable to Auto  Set Start pressure to 4.0 cmH2O  Set Humidifier level to 4  03/23/2022, 10:50 AM    Settings confirmed on device    Set Mode to AutoSet  Set Min Pressure to 5 cmH2O  Set Max Pressure to 20 cmH2O  Set Response to Standard  Set EPR to Fulltime  Set EPR level to 2  Set Ramp enable to Auto  Set Start pressure to 4.0 cmH2O  Set Humidifier level to 4  03/23/2022, 10:50 AM    Settings confirmed on device    Set Mode to AutoSet  Set Min Pressure to 5 cmH2O  Set Max Pressure to 20 cmH2O  Set Response to Standard  Set EPR to Off  Set Ramp enable to Auto  Set Start pressure to 4.0 cmH2O  Set Humidifier level to 4

## 2025-05-23 ENCOUNTER — DOCUMENTATION (OUTPATIENT)
Dept: SLEEP MEDICINE | Facility: HOSPITAL | Age: 70
End: 2025-05-23
Payer: MEDICARE

## 2025-05-23 NOTE — PROGRESS NOTES
Per APRN Cindi:  Pressure changes were made in sleep center office in [facility]:    Device   [id number] - AirSense 11 AutoSet   View  05/23/2025, 12:06 PM    by Anuj Lilly    Settings sent to device    Request [id number]    Set Mode to AutoSet  Set Min Pressure to 7 cmH2O  Set Max Pressure to 18 cmH2O  Set Response to Standard  Set EPR to Fulltime  Set EPR level to 2  Set Ramp enable to On  Set Ramp time to 20 min  Set Start pressure to 5.0 cmH2O  Set Climate Control to Auto  Set Humidifier level to Auto  Set Tube temperature to Auto  05/20/2025, 03:27 AM    Settings confirmed on device    Set Mode to AutoSet  Set Min Pressure to 10 cmH2O  Set Max Pressure to 20 cmH2O  Set Response to Standard  Set EPR to Fulltime  Set EPR level to 2  Set Ramp enable to On  Set Ramp time to 20 min  Set Start pressure to 5.0 cmH2O  Set Climate Control to Auto  Set Humidifier level to Auto  Set Tube temperature to Auto

## 2025-06-04 ENCOUNTER — TELEPHONE (OUTPATIENT)
Dept: SLEEP MEDICINE | Facility: HOSPITAL | Age: 70
End: 2025-06-04
Payer: MEDICARE

## 2025-06-05 ENCOUNTER — DOCUMENTATION (OUTPATIENT)
Dept: SLEEP MEDICINE | Facility: HOSPITAL | Age: 70
End: 2025-06-05
Payer: MEDICARE

## 2025-06-05 NOTE — PROGRESS NOTES
Pt called the sleep center office and stated that he is having pressure issues with his device.    Epic msg had been sent to Dr. Wolfe and DORIE CHOWDHURY.  Per DORIE CHOWDHURY pressure changes were made in [facility] in the sleep center office.  Pt has been called and told of the pressure change and if any more issues pls reach out to the sleep center.    Device   [id number] - AirSense 11 AutoSet   View  06/05/2025, 06:04 AM    by Anuj Lilly    Settings sent to device    Request [id number]    Set Mode to AutoSet  Set Min Pressure to 8 cmH2O  Set Max Pressure to 18 cmH2O  Set Response to Standard  Set EPR to Fulltime  Set EPR level to 2  Set Ramp enable to On  Set Ramp time to 20 min  Set Start pressure to 5.0 cmH2O  Set Climate Control to Auto  Set Humidifier level to Auto  Set Tube temperature to Auto  05/30/2025, 09:57 PM    Settings confirmed on device    Set Mode to AutoSet  Set Min Pressure to 10 cmH2O  Set Max Pressure to 20 cmH2O  Set Response to Standard  Set EPR to Fulltime  Set EPR level to 2  Set Ramp enable to On  Set Ramp time to 20 min  Set Start pressure to 5.0 cmH2O  Set Climate Control to Auto  Set Humidifier level to Auto  Set Tube temperature to Auto

## 2025-06-07 ENCOUNTER — TELEPHONE (OUTPATIENT)
Dept: SLEEP MEDICINE | Facility: HOSPITAL | Age: 70
End: 2025-06-07
Payer: MEDICARE

## 2025-06-07 NOTE — TELEPHONE ENCOUNTER
----- Message from Cindi Day sent at 6/4/2025  4:42 PM EDT -----  Please change to 8-18 cm H2O. Call if intolerant. Let's review download in a few weeks.  ----- Message -----  From: Jana Wagner  Sent: 6/4/2025   2:28 PM EDT  To: Ruben Wolfe MD; DORIE Velasquez    Patient called today stating he would like pressure back down.

## 2025-07-07 RX ORDER — SILDENAFIL 100 MG/1
100 TABLET, FILM COATED ORAL DAILY PRN
Qty: 75 TABLET | Refills: 0 | Status: SHIPPED | OUTPATIENT
Start: 2025-07-07

## 2025-07-08 ENCOUNTER — TELEPHONE (OUTPATIENT)
Dept: SLEEP MEDICINE | Facility: HOSPITAL | Age: 70
End: 2025-07-08
Payer: MEDICARE

## 2025-07-08 ENCOUNTER — DOCUMENTATION (OUTPATIENT)
Dept: SLEEP MEDICINE | Facility: HOSPITAL | Age: 70
End: 2025-07-08
Payer: MEDICARE

## 2025-07-08 NOTE — TELEPHONE ENCOUNTER
----- Message from Ruben Wolfe sent at 7/8/2025  8:53 AM EDT -----  Regarding: RE: Down load that you requested from 6/4/2025  Reviewed. Looks like he did not tolerate higher pr and was changed to 8-18. AHI is slightly better. If he is still sleepy can do titration study. Otherwise see as scheduled.    Dr. Wolfe  ----- Message -----  From: Maritza Martinez  Sent: 7/2/2025  10:06 AM EDT  To: Ruben Wolfe MD; DORIE Velasquez  Subject: Down load that you requested from 6/4/2025       Pressure changes were made on 6/4/2025 per Epic message.  I have printed off the DL and sent it to scan today.

## 2025-07-09 ENCOUNTER — TELEPHONE (OUTPATIENT)
Dept: SLEEP MEDICINE | Facility: HOSPITAL | Age: 70
End: 2025-07-09
Payer: MEDICARE

## 2025-07-09 ENCOUNTER — DOCUMENTATION (OUTPATIENT)
Dept: SLEEP MEDICINE | Facility: HOSPITAL | Age: 70
End: 2025-07-09
Payer: MEDICARE

## 2025-07-09 NOTE — PROGRESS NOTES
Per Epic meessage from Dr. Wolfe.  Pt was called and asked how he is doing with the new pressure changes.  Pt stated that he feels fine and will call the sleep center if he has any issues.

## 2025-07-09 NOTE — TELEPHONE ENCOUNTER
----- Message from Maritza IZAGUIRRE sent at 7/9/2025  8:56 AM EDT -----  Regarding: RE: Down load that you requested from 6/4/2025  Called Pt this morning.  Pt stated that he has no issue as of now with feeling sleepy.  Pt stated that he will call the sleep center if he is having any issue .  ----- Message -----  From: Ruben Wolfe MD  Sent: 7/8/2025   8:54 AM EDT  To: DORIE Velasquez; Maritza Martinez  Subject: RE: Down load that you requested from 6/4/20#    Reviewed. Looks like he did not tolerate higher pr and was changed to 8-18. AHI is slightly better. If he is still sleepy can do titration study. Otherwise see as scheduled.    Dr. Wolfe  ----- Message -----  From: Maritza Martinez  Sent: 7/2/2025  10:06 AM EDT  To: Ruben Wolfe MD; DORIE Velasquez  Subject: Down load that you requested from 6/4/2025       Pressure changes were made on 6/4/2025 per Epic message.  I have printed off the DL and sent it to scan today.

## 2025-07-24 ENCOUNTER — TELEPHONE (OUTPATIENT)
Dept: SLEEP MEDICINE | Facility: HOSPITAL | Age: 70
End: 2025-07-24
Payer: MEDICARE

## 2025-07-24 NOTE — TELEPHONE ENCOUNTER
----- Message from Maritza IZAGUIRRE sent at 7/9/2025  8:56 AM EDT -----  Regarding: RE: Down load that you requested from 6/4/2025  Called Pt this morning.  Pt stated that he has no issue as of now with feeling sleepy.  Pt stated that he will call the sleep center if he is having any issue .  ----- Message -----  From: Ruben Wolfe MD  Sent: 7/8/2025   8:54 AM EDT  To: DORIE Velasquez; Martiza Martinez  Subject: RE: Down load that you requested from 6/4/20#    Reviewed. Looks like he did not tolerate higher pr and was changed to 8-18. AHI is slightly better. If he is still sleepy can do titration study. Otherwise see as scheduled.    Dr. Wolfe  ----- Message -----  From: Maritza Martinez  Sent: 7/2/2025  10:06 AM EDT  To: Ruben Wolfe MD; DORIE Velasquez  Subject: Down load that you requested from 6/4/2025       Pressure changes were made on 6/4/2025 per Epic message.  I have printed off the DL and sent it to scan today.